# Patient Record
Sex: MALE | Race: BLACK OR AFRICAN AMERICAN | NOT HISPANIC OR LATINO | Employment: FULL TIME | URBAN - NONMETROPOLITAN AREA
[De-identification: names, ages, dates, MRNs, and addresses within clinical notes are randomized per-mention and may not be internally consistent; named-entity substitution may affect disease eponyms.]

---

## 2020-11-30 ENCOUNTER — OFFICE VISIT (OUTPATIENT)
Dept: FAMILY MEDICINE | Facility: OTHER | Age: 20
End: 2020-11-30
Attending: NURSE PRACTITIONER

## 2020-11-30 VITALS
OXYGEN SATURATION: 100 % | RESPIRATION RATE: 16 BRPM | TEMPERATURE: 98 F | WEIGHT: 141.6 LBS | DIASTOLIC BLOOD PRESSURE: 74 MMHG | SYSTOLIC BLOOD PRESSURE: 128 MMHG | HEART RATE: 97 BPM

## 2020-11-30 DIAGNOSIS — R36.9 PENILE DISCHARGE: Primary | ICD-10-CM

## 2020-11-30 DIAGNOSIS — Z01.89 PATIENT REQUEST FOR DIAGNOSTIC TESTING: ICD-10-CM

## 2020-11-30 LAB
ALBUMIN UR-MCNC: NEGATIVE MG/DL
APPEARANCE UR: NORMAL
BILIRUB UR QL STRIP: NEGATIVE
C TRACH DNA SPEC QL NAA+PROBE: ABNORMAL
COLOR UR AUTO: YELLOW
GLUCOSE UR STRIP-MCNC: NEGATIVE MG/DL
HGB UR QL STRIP: NEGATIVE
KETONES UR STRIP-MCNC: NEGATIVE MG/DL
LEUKOCYTE ESTERASE UR QL STRIP: NEGATIVE
N GONORRHOEA DNA SPEC QL NAA+PROBE: NOT DETECTED
NITRATE UR QL: NEGATIVE
PH UR STRIP: 7 PH (ref 5–7)
RBC #/AREA URNS AUTO: 1 /HPF (ref 0–2)
SOURCE: NORMAL
SP GR UR STRIP: 1.02 (ref 1–1.03)
SPECIMEN SOURCE: ABNORMAL
UROBILINOGEN UR STRIP-MCNC: NORMAL MG/DL (ref 0–2)
WBC #/AREA URNS AUTO: 5 /HPF (ref 0–5)

## 2020-11-30 PROCEDURE — 250N000011 HC RX IP 250 OP 636: Performed by: NURSE PRACTITIONER

## 2020-11-30 PROCEDURE — 81001 URINALYSIS AUTO W/SCOPE: CPT | Mod: ZL | Performed by: NURSE PRACTITIONER

## 2020-11-30 PROCEDURE — 87591 N.GONORRHOEAE DNA AMP PROB: CPT | Mod: ZL | Performed by: NURSE PRACTITIONER

## 2020-11-30 PROCEDURE — 250N000009 HC RX 250: Performed by: NURSE PRACTITIONER

## 2020-11-30 PROCEDURE — 87491 CHLMYD TRACH DNA AMP PROBE: CPT | Mod: ZL | Performed by: NURSE PRACTITIONER

## 2020-11-30 PROCEDURE — 96372 THER/PROPH/DIAG INJ SC/IM: CPT | Performed by: NURSE PRACTITIONER

## 2020-11-30 PROCEDURE — 99214 OFFICE O/P EST MOD 30 MIN: CPT | Performed by: NURSE PRACTITIONER

## 2020-11-30 RX ORDER — AZITHROMYCIN 500 MG/1
1000 TABLET, FILM COATED ORAL ONCE
Qty: 2 TABLET | Refills: 0 | Status: SHIPPED | OUTPATIENT
Start: 2020-11-30 | End: 2020-11-30

## 2020-11-30 RX ORDER — LIDOCAINE HYDROCHLORIDE 10 MG/ML
5 INJECTION, SOLUTION EPIDURAL; INFILTRATION; INTRACAUDAL; PERINEURAL ONCE
Status: COMPLETED | OUTPATIENT
Start: 2020-11-30 | End: 2020-11-30

## 2020-11-30 RX ORDER — CEFTRIAXONE SODIUM 250 MG
250 VIAL (EA) INJECTION ONCE
Status: COMPLETED | OUTPATIENT
Start: 2020-11-30 | End: 2020-11-30

## 2020-11-30 RX ORDER — DOXYCYCLINE 100 MG/1
100 CAPSULE ORAL 2 TIMES DAILY
Qty: 20 CAPSULE | Refills: 0 | Status: SHIPPED | OUTPATIENT
Start: 2020-11-30 | End: 2020-12-07

## 2020-11-30 RX ADMIN — LIDOCAINE HYDROCHLORIDE 5 ML: 10 INJECTION, SOLUTION EPIDURAL; INFILTRATION; INTRACAUDAL; PERINEURAL at 16:02

## 2020-11-30 RX ADMIN — CEFTRIAXONE SODIUM 250 MG: 250 INJECTION, POWDER, FOR SOLUTION INTRAMUSCULAR; INTRAVENOUS at 15:59

## 2020-11-30 ASSESSMENT — PAIN SCALES - GENERAL: PAINLEVEL: NO PAIN (0)

## 2020-11-30 NOTE — PATIENT INSTRUCTIONS
Patient Education     Understanding STIs    When it comes to sex, nothing is risk-free. Any sexual contact with the penis, vagina, anus, or mouth can spread a sexually transmitted infection (STI). These include chlamydia, gonorrhea, herpes, HIV, and genital warts. STIs are also known as sexually transmitted diseases (STDs). The only sure way to prevent STIs is not having sex (abstinence). But there are ways to make sex safer. Use a latex condom each time you have sex. And choose your partner wisely.  Use condoms for safer sex  If you have sex, latex condoms provide the best protection against STIs. Latex condoms stop the exchange of body fluids that carry certain STIs. They also limit contact with affected skin. Be aware that a condom doesn t cover all skin. So affected skin that isn't covered can still transfer disease. But you re safer with a condom than without one. Use a condom even if you use other birth control. Birth control methods such as the pill or IUD help prevent pregnancy, but they don't protect against STIs.  Choose the right condom  Condoms made of latex prevent disease best. If you re allergic to latex, use polyurethane condoms instead. Male condoms fit over the penis. Female condoms line the vagina. Before buying a condom, read the label to be sure it prevents disease. Some novelty condoms don t.  The right lubricant helps  Buy lubricated condoms or use lubricant. This provides greater comfort and reduces the risk for condom breakage. Use only water-based lubricants. Don t use oil, lotion, or petroleum jelly. They can weaken the condom, causing breakage. Also, you may want to choose lubricants without nonoxynol-9. This spermicide may cause irritation. It can raise the risk for certain STIs.  Use condoms correctly  For condoms to work, they must be used the right way. Keep these tips in mind:    Use a new latex condom each time you have sex. Slip the condom on the penis before any contact is  made.    When ready to withdraw, hold the rim of the condom as the penis pulls out. This prevents the condom from slipping off.    Check the expiration date before using a condom.    Don t store condoms in places that can get hot, such as a car or a wallet that is carried in a back pocket.  Get to know your partner  Safer sex is a process. It involves getting to know your partner and making informed choices. Ask each other how many partners you have had in the past, and how many you have now. Find out if either of you has HIV or any other STI. If you decide to have sex, use a condom each time. Don t stop using condoms unless you re sure neither of you has other partners and you ve both been tested to confirm you don t have HIV or other STIs. Then stay free of disease by having sex only with each other (monogamy).  Keep your cool  Don t let alcohol or drugs cloud your judgment. They could lead you to have sex with someone you wouldn t have chosen if you were sober. Or you might forget to use a condom. If you do plan to have sex, keep a latex condom with you. Don t wait until you re in the heat of passion to try to find one.  Consider abstinence  The only way to be sure you won t get an STI is to abstain from sex. Abstinence is a choice that many people make at some point in their life. Maybe you want to wait until you are sure you re ready before you have sex. Maybe you d like a break from the responsibilities of sex for a while. Or maybe you just want to know your partner better before taking the next step. Abstinence is a choice you can make now to protect your future.  [employer] last reviewed this educational content on 12/1/2018 2000-2020 [employer]. [address] [place] [address], [place] [address]. All rights reserved. This information is not intended as a substitute for professional medical care. Always follow your healthcare professional's instructions.

## 2020-11-30 NOTE — NURSING NOTE
Chief Complaint   Patient presents with     STD     testing     Patient is here for yellow discharge from his penis that started last night along with cloudy urine with an odor to it.     Initial /74   Pulse 97   Temp 98  F (36.7  C) (Tympanic)   Resp 16   Wt 64.2 kg (141 lb 9.6 oz)   SpO2 100%  There is no height or weight on file to calculate BMI.  Medication Reconciliation: complete    Ana Nogueira LPN

## 2020-11-30 NOTE — PROGRESS NOTES
HPI:    Reymundo James is a 20 year old male  who presents to [facility] today for STD testing. Patient reports yellow discharge, odor and itching. He first noticed symptoms last night. The patient reports that he was sexually active with a new partner 3 weeks ago and did not wear a condom. Has not heard from any sexual partners about them having an STD.  No burning or pain with urination. Just noticed the discharge twice since yesterday.    History reviewed. No pertinent past medical history.  History reviewed. No pertinent surgical history.  Social History     Tobacco Use     Smoking status: Current Every Day Smoker     Smokeless tobacco: Never Used     Tobacco comment: vapes   Substance Use Topics     Alcohol use: Yes     No current outpatient medications on file.     No Known Allergies      Past medical history, past surgical history, current medications and allergies reviewed and accurate to the best of my knowledge.        ROS:  Refer to HPI    /74   Pulse 97   Temp 98  F (36.7  C) (Tympanic)   Resp 16   Wt 64.2 kg (141 lb 9.6 oz)   SpO2 100%     EXAM:  General Appearance: Well appearing male, appropriate appearance for age. No acute distress  Respiratory: normal chest wall and respirations.  Normal effort.  Clear to auscultation bilaterally, no wheezing, crackles or rhonchi.  No increased work of breathing.  No cough appreciated.  Cardiac: RRR with no murmurs  :  No suprapubic tenderness to palpation.  No CVA tenderness to palpation. No discharge noted on penile exam FADY Perez was in the exam room during the exam on the gertrude area.    Dermatological: no rashes noted of exposed skin  Psychological: normal affect, alert, oriented, and pleasant.       Labs:  Results for orders placed or performed in visit on 11/30/20   UA reflex to Microscopic and Culture     Status: None    Specimen: Unspecified Urine   Result Value Ref Range    Color Urine Yellow     Appearance Urine Slightly Cloudy     Glucose  Urine Negative NEG^Negative mg/dL    Bilirubin Urine Negative NEG^Negative    Ketones Urine Negative NEG^Negative mg/dL    Specific Gravity Urine 1.025 1.003 - 1.035    Blood Urine Negative NEG^Negative    pH Urine 7.0 5.0 - 7.0 pH    Protein Albumin Urine Negative NEG^Negative mg/dL    Urobilinogen mg/dL Normal 0.0 - 2.0 mg/dL    Nitrite Urine Negative NEG^Negative    Leukocyte Esterase Urine Negative NEG^Negative    Source Unspecified Urine     RBC Urine 1 0 - 2 /HPF    WBC Urine 5 0 - 5 /HPF   GC/Chlamydia by PCR     Status: Abnormal    Specimen: Urine   Result Value Ref Range    Specimen Source Urine     Neisseria gonorrhoreae PCR Not Detected NDET^Not Detected    Chlamydia Trachomatis PCR Detected, Abnormal Result (A) NDET^Not Detected           ASSESSMENT/PLAN:  1. Penile discharge    - UA reflex to Microscopic and Culture  - cefTRIAXone (ROCEPHIN) injection 250 mg  - azithromycin (ZITHROMAX) 500 MG tablet; Take 2 tablets (1,000 mg) by mouth once for 1 dose  Dispense: 2 tablet; Refill: 0  - doxycycline hyclate (VIBRAMYCIN) 100 MG capsule; Take 1 capsule (100 mg) by mouth 2 times daily for 10 days  Dispense: 20 capsule; Refill: 0  - lidocaine (PF) (XYLOCAINE) 1 % injection 5 mL    2. Patient request for diagnostic testing    - GC/Chlamydia by PCR    The patient was treated with rocephin IM during today's visit, to cover for gonorrhea so the patient is able to go home while awaiting STD results. The patient was positive for chlamydia. Azithromycin 1000 mg was prescribed to treat the chlamydia. The patient was also prescribed doxycycline, in case of UTI. However, the patient's UA was negative. The patient was instructed to not take the doxycyline.     The patient was instructed to contact his last partner from 3 weeks ago and inform her that he tested postive for chlamydia and she needs to be tested.     The patient was instructed to avoid sexual intercourse for the next 7 days.     If his symptoms do not resolve  he needs to follow up with primary care.        Discussed warning signs/symptoms indicative of need to f/u    Follow up if symptoms persist or worsen or concerns      I explained my diagnostic considerations and recommendations to the patient, who voiced understanding and agreement with the treatment plan. All questions were answered. We discussed potential side effects of any prescribed or recommended therapies, as well as expectations for response to treatments.    Disclaimer:  This note consists of words and symbols derived from keyboarding, dictation, or using voice recognition software. As a result, there may be errors in the script that have gone undetected. Please consider this when interpreting information found in this note.

## 2020-12-07 ENCOUNTER — OFFICE VISIT (OUTPATIENT)
Dept: FAMILY MEDICINE | Facility: OTHER | Age: 20
End: 2020-12-07
Attending: FAMILY MEDICINE

## 2020-12-07 VITALS
TEMPERATURE: 98.6 F | OXYGEN SATURATION: 99 % | RESPIRATION RATE: 14 BRPM | WEIGHT: 142 LBS | SYSTOLIC BLOOD PRESSURE: 122 MMHG | HEART RATE: 88 BPM | DIASTOLIC BLOOD PRESSURE: 72 MMHG

## 2020-12-07 DIAGNOSIS — A74.9 CHLAMYDIA: Primary | ICD-10-CM

## 2020-12-07 PROCEDURE — 99213 OFFICE O/P EST LOW 20 MIN: CPT | Performed by: FAMILY MEDICINE

## 2020-12-07 RX ORDER — DOXYCYCLINE 100 MG/1
100 CAPSULE ORAL 2 TIMES DAILY
Qty: 14 CAPSULE | Refills: 0 | Status: SHIPPED | OUTPATIENT
Start: 2020-12-07 | End: 2020-12-07

## 2020-12-07 RX ORDER — AZITHROMYCIN 500 MG/1
TABLET, FILM COATED ORAL
COMMUNITY
Start: 2020-11-30 | End: 2020-12-07

## 2020-12-07 RX ORDER — DOXYCYCLINE 100 MG/1
100 CAPSULE ORAL 2 TIMES DAILY
Qty: 14 CAPSULE | Refills: 0 | Status: SHIPPED | OUTPATIENT
Start: 2020-12-07 | End: 2021-03-22

## 2020-12-07 ASSESSMENT — PAIN SCALES - GENERAL: PAINLEVEL: MILD PAIN (2)

## 2020-12-07 NOTE — NURSING NOTE
Chief Complaint   Patient presents with     RECHECK     Symptoms not clearing       Initial /72   Pulse 88   Temp 98.6  F (37  C) (Temporal)   Resp 14   Wt 64.4 kg (142 lb)   SpO2 99%  There is no height or weight on file to calculate BMI.  Medication Reconciliation: complete    Tianna Leung CMA ([facility])

## 2020-12-07 NOTE — PROGRESS NOTES
SUBJECTIVE:   Reymundo James is a 20 year old male who presents to clinic today for the following health issues: Ongoing symptoms    Patient arrives here for ongoing symptoms.  Last week he was tested for chlamydia which was positive.  Start Zithromax 1 g dose.  No sexual activity since then but he still states he has some discharge.  And dysuria at the end of urination.  No new partners.  He did inform his partner about his positive test.        There are no active problems to display for this patient.    History reviewed. No pertinent past medical history.     Review of Systems     OBJECTIVE:     /72   Pulse 88   Temp 98.6  F (37  C) (Temporal)   Resp 14   Wt 64.4 kg (142 lb)   SpO2 99%   There is no height or weight on file to calculate BMI.  Physical Exam  Constitutional:       Appearance: Normal appearance.   Genitourinary:     Penis: Normal.       Comments: No discharge present on exam  Neurological:      Mental Status: He is alert.         Diagnostic Test Results:  none     ASSESSMENT/PLAN:         1. Chlamydia  We will treat with Doxy.  Advised patient if symptoms do not resolve in 1 week to return.  At that time we will retest him for chlamydia GC.  - doxycycline hyclate (VIBRAMYCIN) 100 MG capsule; Take 1 capsule (100 mg) by mouth 2 times daily  Dispense: 14 capsule; Refill: 0      Eugenio Ocampo MD  [facility] [place]

## 2021-03-22 ENCOUNTER — OFFICE VISIT (OUTPATIENT)
Dept: FAMILY MEDICINE | Facility: OTHER | Age: 21
End: 2021-03-22
Attending: NURSE PRACTITIONER

## 2021-03-22 VITALS
WEIGHT: 152.2 LBS | TEMPERATURE: 98.6 F | DIASTOLIC BLOOD PRESSURE: 74 MMHG | OXYGEN SATURATION: 98 % | SYSTOLIC BLOOD PRESSURE: 130 MMHG | HEART RATE: 102 BPM | RESPIRATION RATE: 18 BRPM

## 2021-03-22 DIAGNOSIS — Z20.2 POSSIBLE EXPOSURE TO STD: Primary | ICD-10-CM

## 2021-03-22 LAB
C TRACH DNA SPEC QL NAA+PROBE: NOT DETECTED
N GONORRHOEA DNA SPEC QL NAA+PROBE: NOT DETECTED
SPECIMEN SOURCE: NORMAL

## 2021-03-22 PROCEDURE — 87591 N.GONORRHOEAE DNA AMP PROB: CPT | Mod: ZL | Performed by: NURSE PRACTITIONER

## 2021-03-22 PROCEDURE — 87491 CHLMYD TRACH DNA AMP PROBE: CPT | Mod: ZL | Performed by: NURSE PRACTITIONER

## 2021-03-22 PROCEDURE — 99213 OFFICE O/P EST LOW 20 MIN: CPT | Performed by: NURSE PRACTITIONER

## 2021-03-22 ASSESSMENT — PAIN SCALES - GENERAL: PAINLEVEL: NO PAIN (0)

## 2021-03-22 NOTE — NURSING NOTE
Chief Complaint   Patient presents with     STD     Had a new sexual partner this past Thursday he states that he has what feels like rug burn on his forskin. He states that he is not having any similar symptoms of when he tested positive for chlamydia.       Initial There were no vitals taken for this visit. There is no height or weight on file to calculate BMI.         Medication Reconciliation: Complete      Jossue Mitchell LPN

## 2021-03-22 NOTE — PROGRESS NOTES
HPI:    Reymundo James is a 20 year old male  who presents to [facility] today for STD testing.    States he tested positive for chlamydia in November, he was treated and would like to get clearance testing.  He had a new partner 4 days ago and wants to get tested.  States after intercourse 4 days ago he tore his foreskin which now has healed.  He denies any penile or groin blisters or pimples.   No penile bleeding.  No penile discharge.  No dysuria.  No hematuria.  No urinary frequency or urgency.  No testicular or scrotal pain or swelling.  No fevers or chills.  No nausea or vomiting.  No other concerns today.  Patient states female partners.  No condom use.      History reviewed. No pertinent past medical history.  History reviewed. No pertinent surgical history.  Social History     Tobacco Use     Smoking status: Current Every Day Smoker     Smokeless tobacco: Never Used     Tobacco comment: vapes   Substance Use Topics     Alcohol use: Yes     Comment: 4 per week     No current outpatient medications on file.     No Known Allergies      Past medical history, past surgical history, current medications and allergies reviewed and accurate to the best of my knowledge.        ROS:  Refer to HPI    /74   Pulse 102   Temp 98.6  F (37  C) (Tympanic)   Resp 18   Wt 69 kg (152 lb 3.2 oz)   SpO2 98%     EXAM:  General Appearance: Well appearing adult male, appropriate appearance for age. No acute distress  Respiratory: normal chest wall and respirations.  Normal effort.  No cough appreciated.  Musculoskeletal:  Equal movement of bilateral upper extremities.  Equal movement of bilateral lower extremities.  Normal gait.     male:  Exam deferred per patient   Psychological: normal affect, alert, oriented, and pleasant.       Labs:  Results for orders placed or performed in visit on 03/22/21   GC/Chlamydia by PCR     Status: None    Specimen: Urine   Result Value Ref Range    Specimen Source Midstream Urine      Neisseria gonorrhoreae PCR Not Detected NDET^Not Detected    Chlamydia Trachomatis PCR Not Detected NDET^Not Detected                 ASSESSMENT/PLAN:    I have reviewed the nursing notes.  I have reviewed the findings, diagnosis, plan and need for follow up with the patient.    1. Possible exposure to STD    - GC/Chlamydia by PCR      Discussed with patient that gonorrhea and chlamydia testing will not detect exposure from 4 days ago.  Patient would still like testing to check for clearance from previous infection from November 2020.    Patient states if he tests negative today he will return in 2 weeks for recheck due to possible exposure 4 days ago.          I explained my diagnostic considerations and recommendations to the patient, who voiced understanding and agreement with the treatment plan. All questions were answered. We discussed potential side effects of any prescribed or recommended therapies, as well as expectations for response to treatments.    Disclaimer:  This note consists of words and symbols derived from keyboarding, dictation, or using voice recognition software. As a result, there may be errors in the script that have gone undetected. Please consider this when interpreting information found in this note.

## 2022-08-29 ENCOUNTER — OFFICE VISIT (OUTPATIENT)
Dept: FAMILY MEDICINE | Facility: OTHER | Age: 22
End: 2022-08-29
Attending: PHYSICIAN ASSISTANT

## 2022-08-29 VITALS
DIASTOLIC BLOOD PRESSURE: 70 MMHG | SYSTOLIC BLOOD PRESSURE: 134 MMHG | TEMPERATURE: 98.5 F | OXYGEN SATURATION: 98 % | HEART RATE: 80 BPM | RESPIRATION RATE: 14 BRPM | WEIGHT: 148 LBS

## 2022-08-29 DIAGNOSIS — A74.9 CHLAMYDIA: ICD-10-CM

## 2022-08-29 DIAGNOSIS — Z11.3 ROUTINE SCREENING FOR STI (SEXUALLY TRANSMITTED INFECTION): Primary | ICD-10-CM

## 2022-08-29 LAB
C TRACH DNA SPEC QL PROBE+SIG AMP: POSITIVE
N GONORRHOEA DNA SPEC QL NAA+PROBE: NEGATIVE

## 2022-08-29 PROCEDURE — 87491 CHLMYD TRACH DNA AMP PROBE: CPT | Mod: ZL | Performed by: PHYSICIAN ASSISTANT

## 2022-08-29 PROCEDURE — 99213 OFFICE O/P EST LOW 20 MIN: CPT | Performed by: PHYSICIAN ASSISTANT

## 2022-08-29 RX ORDER — DOXYCYCLINE 100 MG/1
100 CAPSULE ORAL 2 TIMES DAILY
Qty: 14 CAPSULE | Refills: 0 | Status: SHIPPED | OUTPATIENT
Start: 2022-08-29 | End: 2022-09-05

## 2022-08-29 ASSESSMENT — PAIN SCALES - GENERAL: PAINLEVEL: NO PAIN (0)

## 2022-08-29 NOTE — PROGRESS NOTES
ASSESSMENT/PLAN:    I have reviewed the nursing notes.  I have reviewed the findings, diagnosis, plan and need for follow up with the patient.    1. Routine screening for STI (sexually transmitted infection)  - GC/Chlamydia by PCR  - Gonorrhea is negative. Chlamydia positive. See #2.     2. Chlamydia  - doxycycline hyclate (VIBRAMYCIN) 100 MG capsule; Take 1 capsule (100 mg) by mouth 2 times daily for 7 days  Dispense: 14 capsule; Refill: 0  - GC positive for chlamydia today - we will treat with Doxycycline, he will take 1 capsule (100 mg) by mouth twice a day for 7 days. Recommend to avoid intercourse for next 10-14 days.  Take medication with food to prevent GI upset. Side effect profile of antibiotic reviewed at length with patient. Safe sex practices were reviewed during visit today.  If patient has any changing or worsening symptoms such as abdominal pain, genitalia concerns/discharge or other concerning symptoms they are agreeable to return for reevaluation. Patient is in agreement and understanding of the above treatment plan. All questions and concerns were addressed and answered to patient's satisfaction. AVS reviewed with patient.     Discussed warning signs/symptoms indicative of need to f/u    Follow up if symptoms persist or worsen or concerns    I explained my diagnostic considerations and recommendations to the patient, who voiced understanding and agreement with the treatment plan. All questions were answered. We discussed potential side effects of any prescribed or recommended therapies, as well as expectations for response to treatments.    Heidi Casarez PA-C  8/29/2022  12:48 PM    HPI:    Reymundo James is a 21 year old male  who presents to [facility] today for concerns of STD screen.     Symptoms:   - Discharge: white - this AM   - Genital lesions: none  - Urinary concerns/symptoms: none  - Exposure: none  - # partners: 1  - Condom use: bibe  - History of STD: chlamydia   - Vaccination  status (Hep A, Hep B, HPV): believes he is up to date on immunizations  - Additional symptoms to report: none     Allergies: none     PCP: none    History reviewed. No pertinent past medical history.  History reviewed. No pertinent surgical history.  Social History     Tobacco Use     Smoking status: Current Some Day Smoker     Smokeless tobacco: Never Used     Tobacco comment: vapes, cigars   Substance Use Topics     Alcohol use: Yes     Comment: 4 per week     No current outpatient medications on file.     No Known Allergies  Past medical history, past surgical history, current medications and allergies reviewed and accurate to the best of my knowledge.      ROS:  Refer to HPI    /70 (BP Location: Right arm, Patient Position: Sitting, Cuff Size: Adult Regular)   Pulse 80   Temp 98.5  F (36.9  C) (Temporal)   Resp 14   Wt 67.1 kg (148 lb)   SpO2 98%     EXAM:  General Appearance: Well appearing 21 year old male, appropriate appearance for age. No acute distress   Respiratory: normal chest wall and respirations.  Normal effort.  Clear to auscultation bilaterally, no wheezing, crackles or rhonchi.  No increased work of breathing.  No cough appreciated.  Cardiac: RRR with no murmurs  Abdomen: soft, nontender, no rigidity, no rebound tenderness or guarding, normal bowel sounds present  :  No suprapubic tenderness to palpation.  Absent CVA tenderness to palpation.    Dermatological: no rashes noted of exposed skin  Psychological: normal affect, alert, oriented, and pleasant.     Labs:  Results for orders placed or performed in visit on 08/29/22   GC/Chlamydia by PCR     Status: Abnormal    Specimen: Urine, Voided   Result Value Ref Range    Chlamydia Trachomatis Positive (A) Negative    Neisseria gonorrhoeae Negative Negative    Narrative    Assay performed using [employer] real-time, reverse-transcriptase PCR.     Xray:  None

## 2022-08-29 NOTE — NURSING NOTE
Chief Complaint   Patient presents with     STD     Testing - some discharge this a.m. 8/29/22       Initial /70 (BP Location: Right arm, Patient Position: Sitting, Cuff Size: Adult Regular)   Pulse 80   Resp 14   Wt 67.1 kg (148 lb)   SpO2 98%  There is no height or weight on file to calculate BMI.  Medication Reconciliation: complete      FOOD SECURITY SCREENING QUESTIONS:    The next two questions are to help us understand your food security.  If you are feeling you need any assistance in this area, we have resources available to support you today.    Hunger Vital Signs:  Within the past 12 months we worried whether our food would run out before we got money to buy more. Never  Within the past 12 months the food we bought just didn't last and we didn't have money to get more. Never        Advance care plan reviewed      Lolly Pozo LPN on 8/29/2022 at 1:09 PM

## 2022-08-29 NOTE — PATIENT INSTRUCTIONS
You were prescribed an antibiotic, please take into consideration the following information:  - Take entire course of antibiotic even if you start to feel better.  - Antibiotics can cause stomach upset including nausea and diarrhea. Read your bottle or ask the pharmacist if antibiotic can be taken with food to help prevent nausea. If you have symptoms of diarrhea you can take an over-the-counter probiotic and/or increase foods with probiotics such as yogurt, Jonathon, sauerkraut.  -Use caution in sunlight as can lead to increased risk of sunburn while on ABX (antibiotics).     - We will call you with results

## 2022-09-06 ENCOUNTER — TELEPHONE (OUTPATIENT)
Dept: FAMILY MEDICINE | Facility: OTHER | Age: 22
End: 2022-09-06

## 2022-09-06 NOTE — TELEPHONE ENCOUNTER
Was in  8/28 and was given Doxycycline 100mg- he threw up the last one as had empty stomach is that going to be ok-left it as only call if think he needs to get more.

## 2022-09-06 NOTE — TELEPHONE ENCOUNTER
Did not call back as does not need to get more.  Codie Frazier LPN..................9/6/2022   1:36 PM

## 2023-01-04 ENCOUNTER — HOSPITAL ENCOUNTER (EMERGENCY)
Facility: OTHER | Age: 23
Discharge: HOME | End: 2023-01-04
Attending: STUDENT IN AN ORGANIZED HEALTH CARE EDUCATION/TRAINING PROGRAM | Admitting: STUDENT IN AN ORGANIZED HEALTH CARE EDUCATION/TRAINING PROGRAM

## 2023-01-04 VITALS
TEMPERATURE: 96.9 F | BODY MASS INDEX: 18.29 KG/M2 | SYSTOLIC BLOOD PRESSURE: 127 MMHG | DIASTOLIC BLOOD PRESSURE: 84 MMHG | WEIGHT: 138 LBS | RESPIRATION RATE: 17 BRPM | OXYGEN SATURATION: 97 % | HEIGHT: 73 IN | HEART RATE: 62 BPM

## 2023-01-04 DIAGNOSIS — S01.511A LIP LACERATION, INITIAL ENCOUNTER: ICD-10-CM

## 2023-01-04 PROCEDURE — 90715 TDAP VACCINE 7 YRS/> IM: CPT | Performed by: STUDENT IN AN ORGANIZED HEALTH CARE EDUCATION/TRAINING PROGRAM

## 2023-01-04 PROCEDURE — 12001 RPR S/N/AX/GEN/TRNK 2.5CM/<: CPT | Performed by: STUDENT IN AN ORGANIZED HEALTH CARE EDUCATION/TRAINING PROGRAM

## 2023-01-04 PROCEDURE — 250N000011 HC RX IP 250 OP 636: Performed by: STUDENT IN AN ORGANIZED HEALTH CARE EDUCATION/TRAINING PROGRAM

## 2023-01-04 PROCEDURE — 99283 EMERGENCY DEPT VISIT LOW MDM: CPT | Mod: 25 | Performed by: STUDENT IN AN ORGANIZED HEALTH CARE EDUCATION/TRAINING PROGRAM

## 2023-01-04 PROCEDURE — 99207 PR NO CHARGE LOS: CPT | Performed by: STUDENT IN AN ORGANIZED HEALTH CARE EDUCATION/TRAINING PROGRAM

## 2023-01-04 PROCEDURE — 90471 IMMUNIZATION ADMIN: CPT | Performed by: STUDENT IN AN ORGANIZED HEALTH CARE EDUCATION/TRAINING PROGRAM

## 2023-01-04 RX ORDER — LIDOCAINE HYDROCHLORIDE AND EPINEPHRINE 10; 10 MG/ML; UG/ML
1 INJECTION, SOLUTION INFILTRATION; PERINEURAL ONCE
Status: DISCONTINUED | OUTPATIENT
Start: 2023-01-04 | End: 2023-01-04 | Stop reason: HOSPADM

## 2023-01-04 RX ORDER — GINSENG 100 MG
500 CAPSULE ORAL ONCE
Status: DISCONTINUED | OUTPATIENT
Start: 2023-01-04 | End: 2023-01-04 | Stop reason: HOSPADM

## 2023-01-04 RX ADMIN — TETANUS TOXOID, REDUCED DIPHTHERIA TOXOID AND ACELLULAR PERTUSSIS VACCINE, ADSORBED 0.5 ML: 5; 2.5; 8; 8; 2.5 SUSPENSION INTRAMUSCULAR at 21:04

## 2023-01-04 ASSESSMENT — ACTIVITIES OF DAILY LIVING (ADL): ADLS_ACUITY_SCORE: 35

## 2023-01-05 NOTE — ED TRIAGE NOTES
Pt arrived to ER after falling while ice skating, laceration to lower lip.  Pt states he did not lose consciousness.      Triage Assessment     Row Name 01/04/23 2048       Triage Assessment (Adult)    Airway WDL WDL       Respiratory WDL    Respiratory WDL WDL       Skin Circulation/Temperature WDL    Skin Circulation/Temperature WDL WDL       Cardiac WDL    Cardiac WDL WDL       Peripheral/Neurovascular WDL    Peripheral Neurovascular WDL WDL       Cognitive/Neuro/Behavioral WDL    Cognitive/Neuro/Behavioral WDL WDL

## 2023-01-05 NOTE — ED PROVIDER NOTES
"  History     Chief Complaint   Patient presents with     Laceration       Reymundo James is a 22 year old male presenting with lip laceration. Sustained this evening after falling while ice skating and hitting face on ice.  Laceration is over his lower lip involving the exterior and interior surfaces.  There is no loss of consciousness.  Patient denies any other discomfort including headache or back pain.  No issues opening closing his jaw.  Teeth do not feel loose.  Tetanus is not felt to be up-to-date.      No Known Allergies    There are no problems to display for this patient.      No past medical history on file.    No past surgical history on file.    No family history on file.    Social History     Tobacco Use     Smoking status: Some Days     Smokeless tobacco: Never     Tobacco comments:     vapes, cigars   Vaping Use     Vaping Use: Some days     Substances: Nicotine   Substance Use Topics     Alcohol use: Yes     Comment: 4 per week     Drug use: Never       Medications:    No current outpatient medications on file.      Review of Systems: See HPI for pertinent negatives and positives. All other systems reviewed and found to be negative.    Physical Exam   /84   Pulse 62   Temp 96.9  F (36.1  C) (Tympanic)   Resp 17   Ht 1.854 m (6' 1\")   Wt 62.6 kg (138 lb)   SpO2 97%   BMI 18.21 kg/m       General: awake, comfortable  HEENT: atraumatic other than lip laceration below, pupils symmetric, able to open and close jaw without issue, all front upper and lower teeth intact and not loose  Respiratory: normal effort  Cardiovascular: appears well perfused  Back: No spinal tenderness  Extremities: no gross deformities  Skin: Three-point star shaped lower inner lip laceration without approximated edges (gaping) and approximately 1 cm in length. Exterior lower lip with 3 mm approximated laceration. All wounds hemostatic, explored to depth without sign of foreign body  Neuro: alert, no focal " deficits    ED Course           No results found for this or any previous visit (from the past 24 hour(s)).    Medications   lidocaine 1% with EPINEPHrine 1:100,000 injection 1 mL (has no administration in time range)   bacitracin ointment 1 g (has no administration in time range)   Tdap (tetanus-diptheria-acell pertussis) (BOOSTRIX) injection PING 0.5 mL (0.5 mLs Intramuscular Given 1/4/23 2104)       Laceration Repair Procedure Note  Verbal consent - obtained  Wound site and length in cms - see physical exam above  Sensory - Intact to light touch  Anesthetic - 1 ml of lidocaine with epinephrine  Irrigation - Performed with normal saline  Debridement - None  Suture - 5-0 resorbable  Number of sutures - 4  Complications - None, the patient tolerated this repair well with no complications  This was Simple laceration repair.       Assessments & Plan (with Medical Decision Making)     I have reviewed the nursing notes.    Lip laceration involving the external skin surface not requiring repair and internal oral mucosa with gaping wound requiring 4 for resorbable stitches per above. Laceration explored to its full depth and there is low suspicion of significant injury to underlying soft tissue. Sensation intact. Based on history and exam, an x-ray was not indicated to evaluate for retained foreign body.  Tetanus did require updating. Laceration repaired per above. Wound care instructions, including ED return precautions, provided.     I have reviewed the findings, diagnosis, plan and need for follow up with the patient.    Patient instructions:   Avoid eating foods for the next 3 days that could damage sutures, such as chips and other foods with potentially sharp or hard edges. Apply bacitracin (topical antibiotic) to skin surface on outside of your lip next 3-5 days. Examine for signs of infection (white/green/yellow discharge, redness, swelling or pain) daily and return to emergency department if any of these are  present. 4 sutures were placed and these are absorbable so should disappear on their own over the upcoming weeks. Alternate tylenol (can take up to 3000 mg in 24 hour period) and ibuprofen (can take up to 2400 mg in 24 hour period) for pain control as needed. Wounds can take up to 1 year to reach their final long-term appearance.    There are no discharge medications for this patient.      Final diagnoses:   Lip laceration, initial encounter - lower       1/4/2023   [facility]       Marco A Harris MD  01/04/23 6297

## 2023-01-05 NOTE — DISCHARGE INSTRUCTIONS
Avoid eating foods for the next 3 days that could damage sutures, such as chips and other foods with potentially sharp or hard edges. Apply bacitracin (topical antibiotic) to skin surface on outside of your lip next 3-5 days. Examine for signs of infection (white/green/yellow discharge, redness, swelling or pain) daily and return to emergency department if any of these are present. 4 sutures were placed and these are absorbable so should disappear on their own over the upcoming weeks. Alternate tylenol (can take up to 3000 mg in 24 hour period) and ibuprofen (can take up to 2400 mg in 24 hour period) for pain control as needed. Wounds can take up to 1 year to reach their final long-term appearance.

## 2023-02-16 ENCOUNTER — OFFICE VISIT (OUTPATIENT)
Dept: FAMILY MEDICINE | Facility: OTHER | Age: 23
End: 2023-02-16

## 2023-02-16 VITALS
SYSTOLIC BLOOD PRESSURE: 136 MMHG | RESPIRATION RATE: 16 BRPM | WEIGHT: 143 LBS | HEIGHT: 72 IN | BODY MASS INDEX: 19.37 KG/M2 | HEART RATE: 108 BPM | TEMPERATURE: 98.6 F | OXYGEN SATURATION: 97 % | DIASTOLIC BLOOD PRESSURE: 60 MMHG

## 2023-02-16 DIAGNOSIS — A74.9 CHLAMYDIA: ICD-10-CM

## 2023-02-16 DIAGNOSIS — R30.0 BURNING WITH URINATION: ICD-10-CM

## 2023-02-16 DIAGNOSIS — Z71.1 CONCERN ABOUT STD IN MALE WITHOUT DIAGNOSIS: ICD-10-CM

## 2023-02-16 DIAGNOSIS — R39.89 URINARY PROBLEM: Primary | ICD-10-CM

## 2023-02-16 LAB
ALBUMIN UR-MCNC: NEGATIVE MG/DL
APPEARANCE UR: CLEAR
BILIRUB UR QL STRIP: NEGATIVE
C TRACH DNA SPEC QL PROBE+SIG AMP: POSITIVE
COLOR UR AUTO: NORMAL
GLUCOSE UR STRIP-MCNC: NEGATIVE MG/DL
HGB UR QL STRIP: NEGATIVE
KETONES UR STRIP-MCNC: NEGATIVE MG/DL
LEUKOCYTE ESTERASE UR QL STRIP: NEGATIVE
N GONORRHOEA DNA SPEC QL NAA+PROBE: NEGATIVE
NITRATE UR QL: NEGATIVE
PH UR STRIP: 6 [PH] (ref 5–9)
SP GR UR STRIP: 1.02 (ref 1–1.03)
UROBILINOGEN UR STRIP-MCNC: NORMAL MG/DL

## 2023-02-16 PROCEDURE — 81003 URINALYSIS AUTO W/O SCOPE: CPT | Mod: ZL

## 2023-02-16 PROCEDURE — 99213 OFFICE O/P EST LOW 20 MIN: CPT

## 2023-02-16 PROCEDURE — 87491 CHLMYD TRACH DNA AMP PROBE: CPT | Mod: ZL

## 2023-02-16 RX ORDER — DOXYCYCLINE 100 MG/1
100 CAPSULE ORAL 2 TIMES DAILY
Qty: 14 CAPSULE | Refills: 0 | Status: SHIPPED | OUTPATIENT
Start: 2023-02-16 | End: 2023-02-23

## 2023-02-16 ASSESSMENT — PAIN SCALES - GENERAL: PAINLEVEL: NO PAIN (0)

## 2023-02-16 NOTE — NURSING NOTE
Chief Complaint   Patient presents with     STD     Std check      Urinary Problem   Patient in clinic for std concerns  Patient has not had partner.  Some burning when urinating.    Advanced Care Planning on file? no    Medication Review Completed: complete    FOOD SECURITY SCREENING QUESTIONS:    The next two questions are to help us understand your food security.  If you are feeling you need any assistance in this area, we have resources available to support you today.    Hunger Vital Signs:  Within the past 12 months we worried whether our food would run out before we got money to buy more. Never  Within the past 12 months the food we bought just didn't last and we didn't have money to get more. Never    Jasmin Lucero LPN

## 2023-02-16 NOTE — PATIENT INSTRUCTIONS
Recommend to avoid intercourse for next 10-14 days.  Take medication with food to prevent GI upset. Avoid taking dairy 2 hours before and two hours after antibiotic intake. Side effect profile of antibiotic reviewed at length with patient. Safe sex practices were reviewed during visit today.  If patient has any changing or worsening symptoms such as abdominal pain, genitalia concerns/discharge or other concerning symptoms they are agreeable to return for reevaluation. Patient is in agreement and understanding of the above treatment plan. All questions and concerns were addressed and answered to patient's satisfaction. AVS reviewed with patient.

## (undated) RX ORDER — NEOMYCIN/BACITRACIN/POLYMYXINB 3.5-400-5K
OINTMENT (GRAM) TOPICAL
Status: DISPENSED
Start: 2023-01-04

## (undated) RX ORDER — LIDOCAINE HYDROCHLORIDE AND EPINEPHRINE 10; 10 MG/ML; UG/ML
INJECTION, SOLUTION INFILTRATION; PERINEURAL
Status: DISPENSED
Start: 2023-01-04

## (undated) RX ORDER — LIDOCAINE HYDROCHLORIDE 10 MG/ML
INJECTION, SOLUTION EPIDURAL; INFILTRATION; INTRACAUDAL; PERINEURAL
Status: DISPENSED
Start: 2020-11-30